# Patient Record
Sex: MALE | Race: ASIAN | Employment: FULL TIME | URBAN - METROPOLITAN AREA
[De-identification: names, ages, dates, MRNs, and addresses within clinical notes are randomized per-mention and may not be internally consistent; named-entity substitution may affect disease eponyms.]

---

## 2020-08-12 ENCOUNTER — OFFICE VISIT (OUTPATIENT)
Dept: URGENT CARE | Facility: URGENT CARE | Age: 25
End: 2020-08-12
Payer: COMMERCIAL

## 2020-08-12 VITALS
TEMPERATURE: 99.2 F | SYSTOLIC BLOOD PRESSURE: 108 MMHG | HEART RATE: 59 BPM | DIASTOLIC BLOOD PRESSURE: 58 MMHG | OXYGEN SATURATION: 100 % | WEIGHT: 145 LBS

## 2020-08-12 DIAGNOSIS — R07.89 ATYPICAL CHEST PAIN: Primary | ICD-10-CM

## 2020-08-12 DIAGNOSIS — K21.9 GASTROESOPHAGEAL REFLUX DISEASE WITHOUT ESOPHAGITIS: ICD-10-CM

## 2020-08-12 PROCEDURE — 99203 OFFICE O/P NEW LOW 30 MIN: CPT | Performed by: FAMILY MEDICINE

## 2020-08-12 NOTE — PROGRESS NOTES
SUBJECTIVE:   Corey Freeman is a 24 year old male presenting with a chief complaint of chest pain with deep breaths.    Was out in [place] and notice symptoms of chest pain with deep breathing the following day, was told that could be due to bad air.  This resolved after couple of days.  Patient was concerned that could be COVID so got tested July 14 and was negative.  Was doing well until 1 week ago, developed similar symptoms - feels irritated in lower neck area when does a deep breath.    Denies any SOB.  No history of asthma.  Denies any alcohol use.  Smoke marijuana twice a day, stopped about 3 weeks ago.  No TOB use.    Found out girlfriend is pregnant and then started to have symptoms.  States that is happy about pregnancy.    Endorse eating spicy foods.  Unsure is symptoms improve when staying with girlfriend's family as he ate better.  Thinks that once symptoms improve that he went back to eating his normal foods.  Did stop drinking coffee.    Family history - no asthma, no early heart attacks    Overall healthy, no medications  No prior surgeries    PCP - none currently    No past medical history on file.  No current outpatient medications on file.     Social History     Tobacco Use     Smoking status: Never Smoker     Smokeless tobacco: Never Used   Substance Use Topics     Alcohol use: Not on file       ROS:  Review of systems negative except as stated above.    OBJECTIVE:  /58   Pulse 59   Temp 99.2  F (37.3  C) (Tympanic)   Wt 65.8 kg (145 lb)   SpO2 100%   GENERAL APPEARANCE: healthy, alert and no distress  EYES: EOMI,  PERRL, conjunctiva clear  NECK: supple, nontender, no lymphadenopathy  RESP: lungs clear to auscultation - no rales, rhonchi or wheezes  CV: regular rates and rhythm, normal S1 S2, no murmur noted  ABDOMEN:  soft, nontender, no HSM or masses and bowel sounds normal  Extremities: no peripheral edema or tenderness, peripheral pulses normal  SKIN: no suspicious lesions or  rashes  PSYCH: mentation appears normal and affect normal/bright    ASSESSMENT/PLAN:  (R07.89) Atypical chest pain  (primary encounter diagnosis)  Plan: omeprazole (PRILOSEC) 20 MG DR capsule            (K21.9) Gastroesophageal reflux disease without esophagitis  Plan: omeprazole (PRILOSEC) 20 MG DR capsule            Suspect that symptoms most likely due to GERD.  Reviewed foods that can worsen symptoms and encourage to minimize or avoid.  RX omeprazole given, recommend to take for at least 2 weeks.    Encourage to establish with PCP for recheck if no improvement of symptoms in 2 weeks    lAonso Ontiveros MD  August 12, 2020 5:08 PM

## 2020-08-12 NOTE — PATIENT INSTRUCTIONS
Take omeprazole daily for at least 2 week  Avoid foods that may worsen symptoms.      Patient Education     Noncardiac Chest Pain    Based on your visit today, the healthcare provider doesn t know what is causing your chest pain. In most cases, people who come to the emergency department with chest pain don t have a problem with their heart. Instead, the pain is caused by other conditions. It's important for the healthcare team to be sure you are not having a life threatening cause for chest pain such as a heart attack, blood clot in the lungs, collapsed lung, ruptured esophagus, or tearing of the aorta. Once these major causes have been ruled out, you may have further evaluation for non-heart causes of chest pain. These may be problems with the lungs, muscles, bones, digestive tract, nerves, or mental health.  Lung problems    Inflammation around the lungs (pleurisy)    Collapsed lung (pneumothorax)    Fluid around the lungs (pleural effusion)    Lung cancer (a rare cause of chest pain)  Muscle or bone problems    Inflamed cartilage between the ribs (costochondritis)    Fibromyalgia    Rheumatoid arthritis    Chest wall strain  Digestive system problems    Reflux    Stomach ulcer    Spasms of the esophagus    Gall stones    Gallbladder inflammation  Mental health conditions    Panic or anxiety attacks    Emotional distress  Your condition doesn t seem serious and your pain doesn t appear to be coming from your heart. But sometimes the signs of a serious problem take more time to appear. Watch for the warning signs listed below.  Home care  Follow these guidelines when caring for yourself at home:    Rest today and avoid strenuous activity.    Take any prescribed medicine as directed.  Follow-up care  Follow up with your healthcare provider, or as advised, if you don t start to feel better within 24 hours.  When to seek medical advice  Call your healthcare provider right away if any of these occur:    A change in  the type of pain. Call if it feels different, becomes more serious, lasts longer, or begins to spread into your shoulder, arm, neck, jaw, or back.    Shortness of breath    You feel more pain when you breathe    Cough with dark-colored mucus or blood    Weakness, dizziness, or fainting    Fever of 100.4 F (38 C) or higher, or as directed by your healthcare provider    Swelling, pain, or redness in one leg  Date Last Reviewed: 12/1/2016 2000-2019 [employer]. [address] [place] [id number]. All rights reserved. This information is not intended as a substitute for professional medical care. Always follow your healthcare professional's instructions.           Patient Education     GERD (Adult)    The esophagus is a tube that carries food from the mouth to the stomach. A valve (the LES, lower esophageal sphincter) at the lower end of the esophagus prevents stomach acid from flowing upward. When this valve doesn't work properly, stomach contents may repeatedly flow back up (reflux) into the esophagus. This is called gastroesophageal reflux disease (GERD). GERD can irritate the esophagus. It can cause problems with pain, swallowing or breathing. In severe cases, GERD can cause recurrent pneumonia (from aspiration or breathing in particles) or other serious problems.  Symptoms of reflux include burning, pressure or sharp pain in the upper abdomen or mid to lower chest. The pain can spread to the neck, back, or shoulder. There may be belching, an acid taste in the back of the throat, chronic cough, or sore throat, or hoarseness. GERD symptoms often occur during the day after a big meal. They can also occur at night when lying down.   Home care  Lifestyle changes can help reduce symptoms. If needed, your healthcare provider may prescribe medicines. Symptoms often improve with treatment, but if treatment is stopped, the symptoms often return after a few months. So most persons with GERD will need  "to continue treatment or get treatment on and off.  Lifestyle changes    Limit or avoid fatty, fried, and spicy foods, as well as coffee, chocolate, mint, and foods with high acid content such as tomatoes and citrus fruit and juices (orange, grapefruit, lemon).    Don t eat large meals, especially at night. Frequent, smaller meals are best. Don't lie down right after eating. And don t eat anything 3 hours before going to bed.    Don't drink alcohol or smoke. As much as possible, stay away from second hand smoke.    If you are overweight, losing weight will reduce symptoms.     Don't wear tight clothing around your stomach area.    If your symptoms occur during sleep, use a foam wedge to elevate your upper body (not just your head.) Or, place 4\" blocks under the head of your bed. Or use 2 bed risers under your bedframe.  Medicines  If needed, medicines can help relieve the symptoms of GERD and prevent damage to the esophagus. Discuss a medicine plan with your healthcare provider. This may include one or more of the following medicines:    Antacids to help neutralize the normal acids in your stomach.    Acid blockers (Histamine or H2 blockers) to decrease acid production.    Acid inhibitors (proton pump inhibitors PPIs) to decrease acid production in a different way than the blockers. They may work better, but can take a little longer to take effect.  Take an antacid 30 to 60 minutes after eating and at bedtime, but not at the same time as an acid blocker.  Try not to take medicines such as ibuprofen and aspirin. If you are taking aspirin for your heart or other medical reasons, talk to your healthcare provider about stopping it.  Follow-up care  Follow up with your healthcare provider or as advised by our staff.  When to seek medical advice  Call your healthcare provider if any of the following occur:    Stomach pain gets worse or moves to the lower right abdomen (appendix area)    Chest pain appears or gets worse, " or spreads to the back, neck, shoulder, or arm    An over-the-counter trial of medicine doesn't relieve your symptoms    Weight loss that can't be explained    Trouble or pain swallowing    Frequent vomiting (can t keep down liquids)    Blood in the stool or vomit (red or black in color)    Feeling weak or dizzy    Fever of 100.4 F (38 C) or higher, or as directed by your healthcare provider  Date Last Reviewed: 3/1/2018    [id number] [employer]. [address] [place] [address]. All rights reserved. This information is not intended as a substitute for professional medical care. Always follow your healthcare professional's instructions.